# Patient Record
Sex: MALE | Race: WHITE | Employment: FULL TIME | URBAN - METROPOLITAN AREA
[De-identification: names, ages, dates, MRNs, and addresses within clinical notes are randomized per-mention and may not be internally consistent; named-entity substitution may affect disease eponyms.]

---

## 2017-01-06 ENCOUNTER — APPOINTMENT (OUTPATIENT)
Dept: CT IMAGING | Age: 52
End: 2017-01-06
Attending: EMERGENCY MEDICINE
Payer: COMMERCIAL

## 2017-01-06 ENCOUNTER — HOSPITAL ENCOUNTER (EMERGENCY)
Age: 52
Discharge: HOME | End: 2017-01-06
Attending: EMERGENCY MEDICINE
Payer: COMMERCIAL

## 2017-01-06 VITALS
DIASTOLIC BLOOD PRESSURE: 92 MMHG | BODY MASS INDEX: 24.44 KG/M2 | RESPIRATION RATE: 16 BRPM | HEIGHT: 71 IN | HEART RATE: 85 BPM | WEIGHT: 174.6 LBS | OXYGEN SATURATION: 98 % | SYSTOLIC BLOOD PRESSURE: 144 MMHG | TEMPERATURE: 98 F

## 2017-01-06 DIAGNOSIS — N20.0 KIDNEY STONE: Primary | ICD-10-CM

## 2017-01-06 LAB
ALBUMIN SERPL BCP-MCNC: 4 G/DL (ref 3.5–5)
ALBUMIN/GLOB SERPL: 1.3 {RATIO} (ref 1.1–2.2)
ALP SERPL-CCNC: 60 U/L (ref 45–117)
ALT SERPL-CCNC: 29 U/L (ref 12–78)
ANION GAP BLD CALC-SCNC: 7 MMOL/L (ref 5–15)
APPEARANCE UR: CLEAR
AST SERPL W P-5'-P-CCNC: 19 U/L (ref 15–37)
BACTERIA URNS QL MICRO: NEGATIVE /HPF
BASOPHILS # BLD AUTO: 0 K/UL (ref 0–0.1)
BASOPHILS # BLD: 0 % (ref 0–1)
BILIRUB SERPL-MCNC: 0.6 MG/DL (ref 0.2–1)
BILIRUB UR QL: NEGATIVE
BUN SERPL-MCNC: 23 MG/DL (ref 6–20)
BUN/CREAT SERPL: 16 (ref 12–20)
CALCIUM SERPL-MCNC: 8.7 MG/DL (ref 8.5–10.1)
CHLORIDE SERPL-SCNC: 105 MMOL/L (ref 97–108)
CO2 SERPL-SCNC: 29 MMOL/L (ref 21–32)
COLOR UR: ABNORMAL
CREAT SERPL-MCNC: 1.44 MG/DL (ref 0.7–1.3)
EOSINOPHIL # BLD: 0 K/UL (ref 0–0.4)
EOSINOPHIL NFR BLD: 0 % (ref 0–7)
EPITH CASTS URNS QL MICRO: ABNORMAL /LPF
ERYTHROCYTE [DISTWIDTH] IN BLOOD BY AUTOMATED COUNT: 12.7 % (ref 11.5–14.5)
GLOBULIN SER CALC-MCNC: 3.1 G/DL (ref 2–4)
GLUCOSE SERPL-MCNC: 98 MG/DL (ref 65–100)
GLUCOSE UR STRIP.AUTO-MCNC: NEGATIVE MG/DL
HCT VFR BLD AUTO: 39.8 % (ref 36.6–50.3)
HGB BLD-MCNC: 13.1 G/DL (ref 12.1–17)
HGB UR QL STRIP: ABNORMAL
HYALINE CASTS URNS QL MICRO: ABNORMAL /LPF (ref 0–5)
KETONES UR QL STRIP.AUTO: NEGATIVE MG/DL
LEUKOCYTE ESTERASE UR QL STRIP.AUTO: NEGATIVE
LYMPHOCYTES # BLD AUTO: 13 % (ref 12–49)
LYMPHOCYTES # BLD: 1.2 K/UL (ref 0.8–3.5)
MCH RBC QN AUTO: 30.1 PG (ref 26–34)
MCHC RBC AUTO-ENTMCNC: 32.9 G/DL (ref 30–36.5)
MCV RBC AUTO: 91.5 FL (ref 80–99)
MONOCYTES # BLD: 1 K/UL (ref 0–1)
MONOCYTES NFR BLD AUTO: 11 % (ref 5–13)
NEUTS SEG # BLD: 6.7 K/UL (ref 1.8–8)
NEUTS SEG NFR BLD AUTO: 76 % (ref 32–75)
NITRITE UR QL STRIP.AUTO: NEGATIVE
PH UR STRIP: 5.5 [PH] (ref 5–8)
PLATELET # BLD AUTO: 179 K/UL (ref 150–400)
POTASSIUM SERPL-SCNC: 4 MMOL/L (ref 3.5–5.1)
PROT SERPL-MCNC: 7.1 G/DL (ref 6.4–8.2)
PROT UR STRIP-MCNC: 30 MG/DL
RBC # BLD AUTO: 4.35 M/UL (ref 4.1–5.7)
RBC #/AREA URNS HPF: ABNORMAL /HPF (ref 0–5)
SODIUM SERPL-SCNC: 141 MMOL/L (ref 136–145)
SP GR UR REFRACTOMETRY: 1.02 (ref 1–1.03)
UA: UC IF INDICATED,UAUC: ABNORMAL
UROBILINOGEN UR QL STRIP.AUTO: 0.2 EU/DL (ref 0.2–1)
WBC # BLD AUTO: 8.9 K/UL (ref 4.1–11.1)
WBC URNS QL MICRO: ABNORMAL /HPF (ref 0–4)

## 2017-01-06 PROCEDURE — 80053 COMPREHEN METABOLIC PANEL: CPT | Performed by: EMERGENCY MEDICINE

## 2017-01-06 PROCEDURE — 85025 COMPLETE CBC W/AUTO DIFF WBC: CPT | Performed by: EMERGENCY MEDICINE

## 2017-01-06 PROCEDURE — 74176 CT ABD & PELVIS W/O CONTRAST: CPT

## 2017-01-06 PROCEDURE — 99284 EMERGENCY DEPT VISIT MOD MDM: CPT

## 2017-01-06 PROCEDURE — 36415 COLL VENOUS BLD VENIPUNCTURE: CPT | Performed by: EMERGENCY MEDICINE

## 2017-01-06 PROCEDURE — 81001 URINALYSIS AUTO W/SCOPE: CPT | Performed by: EMERGENCY MEDICINE

## 2017-01-06 RX ORDER — TAMSULOSIN HYDROCHLORIDE 0.4 MG/1
0.4 CAPSULE ORAL DAILY
Qty: 7 CAP | Refills: 0 | Status: SHIPPED | OUTPATIENT
Start: 2017-01-06 | End: 2017-01-13

## 2017-01-06 RX ORDER — HYDROCODONE BITARTRATE AND ACETAMINOPHEN 5; 325 MG/1; MG/1
1 TABLET ORAL
Qty: 20 TAB | Refills: 0 | Status: SHIPPED | OUTPATIENT
Start: 2017-01-06

## 2017-01-06 RX ORDER — KETOROLAC TROMETHAMINE 10 MG/1
10 TABLET, FILM COATED ORAL
Qty: 10 TAB | Refills: 0 | Status: SHIPPED | OUTPATIENT
Start: 2017-01-06

## 2017-01-07 NOTE — ED NOTES
Pt discharged with written instructions and prescriptions at this time. Pt verbalizes understanding and all questions were answered. Discharged by Nicolas Chaves, in stable condition, with wife.

## 2017-01-07 NOTE — ED NOTES
Assumed care of pt at this time. Pt states that he started having left flank pain around 0730 this am that starts in the groin area and travel to the left side of his abdomen to his left flank. Pt states that this has happened to him in the past but it usually does not last more than 2 hrs but this time it is has been going on for 14 hrs. Pt complains of 3 out of 10 tolerable pain left flank pain. Assessment done at this time. Call bell in reach.

## 2017-01-07 NOTE — DISCHARGE INSTRUCTIONS
Kidney Stone: Care Instructions  Your Care Instructions    Kidney stones are formed when salts, minerals, and other substances normally found in the urine clump together. They can be as small as grains of sand or, rarely, as large as golf balls. While the stone is traveling through the ureter, which is the tube that carries urine from the kidney to the bladder, you will probably feel pain. The pain may be mild or very severe. You may also have some blood in your urine. As soon as the stone reaches the bladder, any intense pain should go away. If a stone is too large to pass on its own, you may need a medical procedure to help you pass the stone. The doctor has checked you carefully, but problems can develop later. If you notice any problems or new symptoms, get medical treatment right away. Follow-up care is a key part of your treatment and safety. Be sure to make and go to all appointments, and call your doctor if you are having problems. It's also a good idea to know your test results and keep a list of the medicines you take. How can you care for yourself at home? · Drink plenty of fluids, enough so that your urine is light yellow or clear like water. If you have kidney, heart, or liver disease and have to limit fluids, talk with your doctor before you increase the amount of fluids you drink. · Take pain medicines exactly as directed. Call your doctor if you think you are having a problem with your medicine. ¨ If the doctor gave you a prescription medicine for pain, take it as prescribed. ¨ If you are not taking a prescription pain medicine, ask your doctor if you can take an over-the-counter medicine. Read and follow all instructions on the label. · Your doctor may ask you to strain your urine so that you can collect your kidney stone when it passes. You can use a kitchen strainer or a tea strainer to catch the stone. Store it in a plastic bag until you see your doctor again.   Preventing future kidney stones  Some changes in your diet may help prevent kidney stones. Depending on the cause of your stones, your doctor may recommend that you:  · Drink plenty of fluids, enough so that your urine is light yellow or clear like water. If you have kidney, heart, or liver disease and have to limit fluids, talk with your doctor before you increase the amount of fluids you drink. · Limit coffee, tea, and alcohol. Also avoid grapefruit juice. · Do not take more than the recommended daily dose of vitamins C and D.  · Avoid antacids such as Gaviscon, Maalox, Mylanta, or Tums. · Limit the amount of salt (sodium) in your diet. · Eat a balanced diet that is not too high in protein. · Limit foods that are high in a substance called oxalate, which can cause kidney stones. These foods include dark green vegetables, rhubarb, chocolate, wheat bran, nuts, cranberries, and beans. When should you call for help? Call your doctor now or seek immediate medical care if:  · You cannot keep down fluids. · Your pain gets worse. · You have a fever or chills. · You have new or worse pain in your back just below your rib cage (the flank area). · You have new or more blood in your urine. Watch closely for changes in your health, and be sure to contact your doctor if:  · You do not get better as expected. Where can you learn more? Go to http://daron-ilan.info/. Enter M298 in the search box to learn more about \"Kidney Stone: Care Instructions. \"  Current as of: November 20, 2015  Content Version: 11.1  © 1754-0630 [employer]. Care instructions adapted under license by [employer] (which disclaims liability or warranty for this information). If you have questions about a medical condition or this instruction, always ask your healthcare professional. [address] any warranty or liability for your use of this information.          Learning About Diet for Kidney Stone Prevention  What are kidney stones? Kidney stones are made of salts and minerals in the urine that form small \"sania. \" Stones can form in the kidneys and the ureters (the tubes that lead from the kidneys to the bladder). They can also form in the bladder. Stones may not cause a problem as long as they stay in the kidneys. But they can cause sudden, severe pain. Pain is most likely when the stones travel from the kidneys to the bladder. Kidney stones can cause bloody urine. Kidney stones often run in families. You are more likely to get them if you don't drink enough fluids, mainly water. Certain foods and drinks and some dietary supplements may also increase your risk for kidney stones if you consume too much of them. What can you do to prevent kidney stones? Changing what you eat may not prevent all types of kidney stones. But for people who have a history of certain kinds of kidney stones, some changes in diet may help. A dietitian can help you set up a meal plan that includes healthy, low-oxalate choices. Here are some general guidelines to get you started. Plan your meals and snacks around foods that are low in oxalate. These foods include:  · Corn, kale, parsnips, and squash,. · Beef, chicken, pork, turkey, and fish. · Milk, butter, cheese, and yogurt. You can eat certain foods that are medium-high in oxalate, but eat them only once in a while. These foods include:  · Bread. · Brown rice. · English muffins. · Figs. · Popcorn. · String beans. · Tomatoes. Limit very high-oxalate foods, including:  · Black tea. · Coffee. · Chocolate. · Dark green vegetables. · Nuts. Here are some other things you can do to help prevent kidney stones. · Drink plenty of fluids. If you have kidney, heart, or liver disease and have to limit fluids, talk with your doctor before you increase the amount of fluids you drink.   · Do not take more than the recommended daily dose of vitamins C and D.  · Limit the salt in your diet. · Eat a balanced diet that is not too high in protein. Follow-up care is a key part of your treatment and safety. Be sure to make and go to all appointments, and call your doctor if you are having problems. It's also a good idea to know your test results and keep a list of the medicines you take. Where can you learn more? Go to http://daron-ilan.info/. Enter C138 in the search box to learn more about \"Learning About Diet for Kidney Stone Prevention. \"  Current as of: July 26, 2016  Content Version: 11.1  © 8291-4169 [employer]. Care instructions adapted under license by [employer] (which disclaims liability or warranty for this information). If you have questions about a medical condition or this instruction, always ask your healthcare professional. [address] any warranty or liability for your use of this information.

## 2017-01-07 NOTE — ED PROVIDER NOTES
HPI Comments: Dayday Mortensen is a 46 y.o. male presenting ambulatory to the ED with c/o intermittent episodes of left flank pain x few months. Pt reports the pain starts in his groin radiates to the left side of his abdomen and left flank. He states episodes usually last 2 hours, however today's episode started around 0730 and has worsened since then. Pt notes he had a physical a few months ago and had a trace amount of blood in his urine. He was told by his PCP Pt denies nausea, vomiting, urine frequency, hematuria, blood in stool, hx of kidney stones, or dysuria. PCP: Frandy Buitrago MD      There are no other complaints, changes or physical findings at this time. Written by RAÚL Arrington, as dictated by Anu Joseph DO      The history is provided by the patient. No past medical history on file. No past surgical history on file. No family history on file. Social History     Social History    Marital status:      Spouse name: N/A    Number of children: N/A    Years of education: N/A     Occupational History    Not on file. Social History Main Topics    Smoking status: Not on file    Smokeless tobacco: Not on file    Alcohol use Not on file    Drug use: Not on file    Sexual activity: Not on file     Other Topics Concern    Not on file     Social History Narrative         ALLERGIES: Review of patient's allergies indicates no known allergies. Review of Systems   Constitutional: Negative for chills, fatigue and fever. HENT: Negative for congestion and rhinorrhea. Eyes: Negative for visual disturbance. Respiratory: Negative for cough, shortness of breath and wheezing. Cardiovascular: Negative for chest pain and palpitations. Gastrointestinal: Positive for abdominal pain (left side). Negative for abdominal distention, constipation, diarrhea, nausea and vomiting. Endocrine: Negative. Genitourinary: Positive for flank pain (left). Negative for difficulty urinating and dysuria. Skin: Negative for rash. Neurological: Negative for dizziness, weakness and light-headedness. Psychiatric/Behavioral: Negative for suicidal ideas. All other systems reviewed and are negative. Patient Vitals for the past 12 hrs:   Temp Pulse Resp BP SpO2   01/06/17 2230 - - - (!) 144/92 98 %   01/06/17 2200 - - - 146/88 98 %   01/06/17 2155 - - - - 95 %   01/06/17 2154 - - - (!) 148/95 -   01/06/17 2130 98 °F (36.7 °C) 85 16 (!) 144/103 100 %              Physical Exam   Constitutional: He is oriented to person, place, and time. He appears well-developed and well-nourished. No distress. HENT:   Head: Normocephalic and atraumatic. Mouth/Throat: Oropharynx is clear and moist.   Eyes: Conjunctivae and EOM are normal.   Neck: Neck supple. No JVD present. No tracheal deviation present. Cardiovascular: Normal rate, regular rhythm and intact distal pulses. Exam reveals no gallop and no friction rub. No murmur heard. Pulmonary/Chest: Effort normal and breath sounds normal. No stridor. No respiratory distress. He has no wheezes. Abdominal: Soft. Bowel sounds are normal. He exhibits no distension and no mass. There is no tenderness. There is no guarding and no CVA tenderness. Musculoskeletal: Normal range of motion. He exhibits no edema or tenderness. No deformity   Neurological: He is alert and oriented to person, place, and time. He has normal strength. No focal deficits   Skin: Skin is warm, dry and intact. No rash noted. Psychiatric: He has a normal mood and affect. His behavior is normal. Judgment and thought content normal.   Nursing note and vitals reviewed.        MDM  Number of Diagnoses or Management Options  Kidney stone:   Diagnosis management comments: DDx: renal stone, uti, pyelonephritis, diverticulitis, constipation       Amount and/or Complexity of Data Reviewed  Clinical lab tests: ordered and reviewed  Tests in the radiology section of CPT®: ordered and reviewed    Patient Progress  Patient progress: stable    ED Course       Procedures    Progress Note:  Reviewed lab and imaging results with patient. Discussed the need for patient to follow up with urology, provided patient with the number for the Kidney Stone hotline at [facility]. Patient in NAD, denies any pain. Patient and family expressed agreement and understanding with plan. Sonia Farrah DO      LABORATORY TESTS:  Recent Results (from the past 12 hour(s))   URINALYSIS W/ REFLEX CULTURE    Collection Time: 01/06/17  9:56 PM   Result Value Ref Range    Color YELLOW/STRAW      Appearance CLEAR CLEAR      Specific gravity 1.023 1.003 - 1.030      pH (UA) 5.5 5.0 - 8.0      Protein 30 (A) NEG mg/dL    Glucose NEGATIVE  NEG mg/dL    Ketone NEGATIVE  NEG mg/dL    Bilirubin NEGATIVE  NEG      Blood LARGE (A) NEG      Urobilinogen 0.2 0.2 - 1.0 EU/dL    Nitrites NEGATIVE  NEG      Leukocyte Esterase NEGATIVE  NEG      WBC 0-4 0 - 4 /hpf    RBC 20-50 0 - 5 /hpf    Epithelial cells FEW FEW /lpf    Bacteria NEGATIVE  NEG /hpf    UA:UC IF INDICATED CULTURE NOT INDICATED BY UA RESULT CNI      Hyaline Cast 0-2 0 - 5 /lpf   METABOLIC PANEL, COMPREHENSIVE    Collection Time: 01/06/17 10:34 PM   Result Value Ref Range    Sodium 141 136 - 145 mmol/L    Potassium 4.0 3.5 - 5.1 mmol/L    Chloride 105 97 - 108 mmol/L    CO2 29 21 - 32 mmol/L    Anion gap 7 5 - 15 mmol/L    Glucose 98 65 - 100 mg/dL    BUN 23 (H) 6 - 20 MG/DL    Creatinine 1.44 (H) 0.70 - 1.30 MG/DL    BUN/Creatinine ratio 16 12 - 20      GFR est AA >60 >60 ml/min/1.73m2    GFR est non-AA 52 (L) >60 ml/min/1.73m2    Calcium 8.7 8.5 - 10.1 MG/DL    Bilirubin, total 0.6 0.2 - 1.0 MG/DL    ALT 29 12 - 78 U/L    AST 19 15 - 37 U/L    Alk.  phosphatase 60 45 - 117 U/L    Protein, total 7.1 6.4 - 8.2 g/dL    Albumin 4.0 3.5 - 5.0 g/dL    Globulin 3.1 2.0 - 4.0 g/dL    A-G Ratio 1.3 1.1 - 2.2     CBC WITH AUTOMATED DIFF    Collection Time: 01/06/17 10:34 PM   Result Value Ref Range    WBC 8.9 4.1 - 11.1 K/uL    RBC 4.35 4.10 - 5.70 M/uL    HGB 13.1 12.1 - 17.0 g/dL    HCT 39.8 36.6 - 50.3 %    MCV 91.5 80.0 - 99.0 FL    MCH 30.1 26.0 - 34.0 PG    MCHC 32.9 30.0 - 36.5 g/dL    RDW 12.7 11.5 - 14.5 %    PLATELET 734 214 - 948 K/uL    NEUTROPHILS 76 (H) 32 - 75 %    LYMPHOCYTES 13 12 - 49 %    MONOCYTES 11 5 - 13 %    EOSINOPHILS 0 0 - 7 %    BASOPHILS 0 0 - 1 %    ABS. NEUTROPHILS 6.7 1.8 - 8.0 K/UL    ABS. LYMPHOCYTES 1.2 0.8 - 3.5 K/UL    ABS. MONOCYTES 1.0 0.0 - 1.0 K/UL    ABS. EOSINOPHILS 0.0 0.0 - 0.4 K/UL    ABS. BASOPHILS 0.0 0.0 - 0.1 K/UL       IMAGING RESULTS:  EXAM: CT ABD PELV WO CONT     INDICATION: Left lower quadrant and left flank pain since this morning     COMPARISON: None.     CONTRAST: None.      TECHNIQUE:   Unenhanced multislice helical CT was performed from the diaphragm to the  symphysis pubis without oral or intravenous contrast administration. Contiguous  5 mm axial images were reconstructed and lung and soft tissue windows were  generated. Coronal and sagittal reformations were generated. CT dose reduction  was achieved through use of a standardized protocol tailored for this  examination and automatic exposure control for dose modulation.     FINDINGS:  The visualized portions of the lung bases are clear.     The absence of intravenous contrast material reduces the sensitivity for  evaluation of the solid parenchymal organs of the abdomen. There is a 9 mm  low-density lesion in the right kidney, too small to characterize given the lack  of intravenous contrast. No focal abnormalities are seen within the liver,  spleen, pancreas or adrenal glands. There is a 7 mm stone in the left mid  ureter causing moderate left hydronephrosis and mild perinephric inflammation. No gallstones are noted.      The aorta tapers without aneurysm. There is no retroperitoneal adenopathy or  mass.  There is no ascites or free intraperitoneal air.     The bowel is not obstructed. The appendix is normal in appearance. There is no  pelvic mass or adenopathy.     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     IMPRESSION  IMPRESSION: 7 mm stone left mid ureter causing moderate left hydronephrosis and  mild perinephric inflammation.       MEDICATIONS GIVEN:  Medications - No data to display    IMPRESSION:  1. Kidney stone        PLAN:  1. Discharge Medication List as of 1/6/2017 11:15 PM      START taking these medications    Details   tamsulosin (FLOMAX) 0.4 mg capsule Take 1 Cap by mouth daily for 7 doses. , Print, Disp-7 Cap, R-0      HYDROcodone-acetaminophen (NORCO) 5-325 mg per tablet Take 1 Tab by mouth every four (4) hours as needed for Pain. Max Daily Amount: 6 Tabs., Print, Disp-20 Tab, R-0      ketorolac (TORADOL) 10 mg tablet Take 1 Tab by mouth every six (6) hours as needed for Pain., Print, Disp-10 Tab, R-0           2. Follow-up Information     Follow up With Details Comments [address] [facility] Schedule an appointment as soon as possible for a visit As needed, If symptoms worsen [address] [facility] [place] [phone]      [facility]ospit[facility] EMERGENCY DEPT Schedule an appointment as soon as possible for a visit As needed, If symptoms worsen [address]  [address] [facility]  [phone]        Return to ED if worse     DISCHARGE NOTE  11:27 PM  The patient has been re-evaluated and is ready for discharge. Reviewed available results with patient. Counseled pt on diagnosis and care plan. Pt has expressed understanding, and all questions have been answered. Pt agrees with plan and agrees to F/U as recommended, or return to the ED if their sxs worsen. Discharge instructions have been provided and explained to the pt, along with reasons to return to the ED. Written by Isaias Caceres, ED Scribe, as dictated by Lucina Butler DO.       This note is prepared by Isaias Caceres, acting as Scribe for [email or web address] Janet Whitt DO. Suma Rolon DO : The scribe's documentation has been prepared under my direction and personally reviewed by me in its entirety. I confirm that the note above accurately reflects all work, treatment, procedures, and medical decision making performed by me.

## 2021-12-07 ENCOUNTER — OFFICE VISIT (OUTPATIENT)
Dept: ORTHOPEDIC SURGERY | Age: 56
End: 2021-12-07
Payer: COMMERCIAL

## 2021-12-07 DIAGNOSIS — M25.561 ACUTE PAIN OF BOTH KNEES: Primary | ICD-10-CM

## 2021-12-07 DIAGNOSIS — M25.562 ACUTE PAIN OF BOTH KNEES: Primary | ICD-10-CM

## 2021-12-07 DIAGNOSIS — M25.461 KNEE EFFUSION, RIGHT: ICD-10-CM

## 2021-12-07 DIAGNOSIS — M25.462 KNEE EFFUSION, LEFT: ICD-10-CM

## 2021-12-07 PROCEDURE — 99203 OFFICE O/P NEW LOW 30 MIN: CPT | Performed by: ORTHOPAEDIC SURGERY

## 2021-12-07 NOTE — PROGRESS NOTES
Carole Colunga (: 1965) is a 64 y.o. male, patient, here for evaluation of the following chief complaint(s):  Knee Pain (bilateral knee pain)       HPI:    Patient presents the office today with a chief complaint of bilateral knee pain. Is a very active 63-year-old. He has been noticing recurrent pain mostly along the medial aspect of the right knee. He also has noted some swelling. He states the swelling is fairly well controlled today. At times he states his knee feels very swollen. Over the past 6 weeks, he has now developed a very similar finding in his left knee. He is very active he likes to play volleyball. He has had to stop playing volleyball secondary to the pain. He has recurrent pain swelling and medial based knee pain. He describes catching and popping sensation in both knees. He has tried anti-inflammatory medication. He is obviously modified his activity. His symptoms are getting worse instead of better. No Known Allergies    Current Outpatient Medications   Medication Sig    HYDROcodone-acetaminophen (NORCO) 5-325 mg per tablet Take 1 Tab by mouth every four (4) hours as needed for Pain. Max Daily Amount: 6 Tabs.  ketorolac (TORADOL) 10 mg tablet Take 1 Tab by mouth every six (6) hours as needed for Pain. No current facility-administered medications for this visit. No past medical history on file. No past surgical history on file. No family history on file.      Social History     Socioeconomic History    Marital status:      Spouse name: Not on file    Number of children: Not on file    Years of education: Not on file    Highest education level: Not on file   Occupational History    Not on file   Tobacco Use    Smoking status: Not on file    Smokeless tobacco: Not on file   Substance and Sexual Activity    Alcohol use: Not on file    Drug use: Not on file    Sexual activity: Not on file   Other Topics Concern    Not on file   Social History Narrative    Not on file     Social Determinants of Health     Financial Resource Strain:     Difficulty of Paying Living Expenses: Not on file   Food Insecurity:     Worried About Running Out of Food in the Last Year: Not on file    Edgar of Food in the Last Year: Not on file   Transportation Needs:     Lack of Transportation (Medical): Not on file    Lack of Transportation (Non-Medical): Not on file   Physical Activity:     Days of Exercise per Week: Not on file    Minutes of Exercise per Session: Not on file   Stress:     Feeling of Stress : Not on file   Social Connections:     Frequency of Communication with Friends and Family: Not on file    Frequency of Social Gatherings with Friends and Family: Not on file    Attends Yarsani Services: Not on file    Active Member of [address] [place] [address] [employer] or Organizations: Not on file    Attends Club or Organization Meetings: Not on file    Marital Status: Not on file   Intimate Partner Violence:     Fear of Current or Ex-Partner: Not on file    Emotionally Abused: Not on file    Physically Abused: Not on file    Sexually Abused: Not on file   Housing Stability:     Unable to Pay for Housing in the Last Year: Not on file    Number of [place] in the Last Year: Not on file    Unstable Housing in the Last Year: Not on file       Review of Systems   Musculoskeletal:        Bilateral knee sudhakar       Vitals: There were no vitals taken for this visit. There is no height or weight on file to calculate BMI. Ortho Exam     Right knee: No effusion. There is no pain with manipulation of the patella. No crepitation with passive or active range of motion of the patella. Positive medial joint line tenderness. Positive medial Nhung's maneuver. There is no lateral joint line tenderness. Nhung's maneuvers negative for lateral joint line tenderness. Collateral ligaments are intact. Lachman's test negative. Anterior drawer and posterior drawer negative. There is no soft tissue swelling distally. Neurovascular examination is intact. Left knee:  No effusion. There is no pain with manipulation of the patella. No crepitation with passive or active range of motion of the patella. Positive medial joint line tenderness. Positive medial Nhung's maneuver. There is no lateral joint line tenderness. Nhung's maneuvers negative for lateral joint line tenderness. Collateral ligaments are intact. Lachman's test negative. Anterior drawer and posterior drawer negative. There is no soft tissue swelling distally. Neurovascular examination is intact. XR Results (most recent):  Results from Appointment encounter on 12/07/21    XR KNEE RT MAX 2 VWS    Narrative  2 view x-ray of his right knee is similar to the left with no significant changes of osteoarthritis. He does have some mild patellofemoral arthritis. XR Results (most recent):  Results from Appointment encounter on 12/07/21    XR KNEE RT MAX 2 VWS    Narrative  2 view x-ray of his right knee is similar to the left with no significant changes of osteoarthritis. He does have some mild patellofemoral arthritis. XR KNEE LT MAX 2 VWS    Narrative  2 view x-ray of his left knee reveals no fracture dislocation no significant signs of osteoarthritis of the tibiofemoral joint. He does have some mild patellofemoral joint changes. ASSESSMENT/PLAN:    Patient presents the office today with signs and symptoms consistent with medial meniscus pathology. At this stage, I would suggest proceeding with an MRI as he has a high probability this may require surgical attention. Patient is to return to the office after the MRI.         Kimberly Garcia MD

## 2021-12-10 ENCOUNTER — TELEPHONE (OUTPATIENT)
Dept: ORTHOPEDIC SURGERY | Age: 56
End: 2021-12-10

## 2021-12-20 DIAGNOSIS — M25.462 KNEE EFFUSION, LEFT: ICD-10-CM

## 2021-12-20 DIAGNOSIS — M25.461 KNEE EFFUSION, RIGHT: Primary | ICD-10-CM

## 2022-01-10 ENCOUNTER — OFFICE VISIT (OUTPATIENT)
Dept: ORTHOPEDIC SURGERY | Age: 57
End: 2022-01-10
Payer: COMMERCIAL

## 2022-01-10 VITALS — BODY MASS INDEX: 23.8 KG/M2 | HEIGHT: 71 IN | WEIGHT: 170 LBS

## 2022-01-10 DIAGNOSIS — M22.42 CHONDROMALACIA OF BOTH PATELLAE: Primary | ICD-10-CM

## 2022-01-10 DIAGNOSIS — M22.41 CHONDROMALACIA OF BOTH PATELLAE: Primary | ICD-10-CM

## 2022-01-10 PROCEDURE — 99213 OFFICE O/P EST LOW 20 MIN: CPT | Performed by: ORTHOPAEDIC SURGERY

## 2022-01-10 NOTE — PROGRESS NOTES
Micahel Hart (: 1965) is a 64 y.o. male, patient, here for evaluation of the following chief complaint(s):  Knee Pain (bilateral knee MRI)       HPI:    Patient returns to the office now s/p MRI evaluation of both knees. He continues to have discomfort in both knees although he states is gotten a little bit better with reduction of his activity. No Known Allergies    Current Outpatient Medications   Medication Sig    HYDROcodone-acetaminophen (NORCO) 5-325 mg per tablet Take 1 Tab by mouth every four (4) hours as needed for Pain. Max Daily Amount: 6 Tabs.  ketorolac (TORADOL) 10 mg tablet Take 1 Tab by mouth every six (6) hours as needed for Pain. No current facility-administered medications for this visit. No past medical history on file. No past surgical history on file. No family history on file. Social History     Socioeconomic History    Marital status:      Spouse name: Not on file    Number of children: Not on file    Years of education: Not on file    Highest education level: Not on file   Occupational History    Not on file   Tobacco Use    Smoking status: Not on file    Smokeless tobacco: Not on file   Substance and Sexual Activity    Alcohol use: Not on file    Drug use: Not on file    Sexual activity: Not on file   Other Topics Concern    Not on file   Social History Narrative    Not on file     Social Determinants of Health     Financial Resource Strain:     Difficulty of Paying Living Expenses: Not on file   Food Insecurity:     Worried About Running Out of Food in the Last Year: Not on file    Edgar of Food in the Last Year: Not on file   Transportation Needs:     Lack of Transportation (Medical): Not on file    Lack of Transportation (Non-Medical):  Not on file   Physical Activity:     Days of Exercise per Week: Not on file    Minutes of Exercise per Session: Not on file   Stress:     Feeling of Stress : Not on file   Social Connections:     Frequency of Communication with Friends and Family: Not on file    Frequency of Social Gatherings with Friends and Family: Not on file    Attends Sikhism Services: Not on file    Active Member of Clubs or Organizations: Not on file    Attends Club or Organization Meetings: Not on file    Marital Status: Not on file   Intimate Partner Violence:     Fear of Current or Ex-Partner: Not on file    Emotionally Abused: Not on file    Physically Abused: Not on file    Sexually Abused: Not on file   Housing Stability:     Unable to Pay for Housing in the Last Year: Not on file    Number of [place] in the Last Year: Not on file    Unstable Housing in the Last Year: Not on file       Review of Systems   Musculoskeletal:        Bilateral knee MRI results       Vitals:  Ht 5' 11\" (1.803 m)   Wt 170 lb (77.1 kg)   BMI 23.71 kg/m²    Body mass index is 23.71 kg/m². Ortho Exam     Right knee: Effusion has resolved. He has full range of motion of the knee. Mild crepitation. He has mild medial as well as lateral joint line tenderness. He has no instability. Neurovascular examinations intact. Left knee: Effusion has resolved. He has full range of motion of the knee. Mild crepitation. He has mild medial as well as lateral joint line tenderness. He has no instability. Neurovascular examinations intact. MRI evaluation of the right knee shows evidence of chondromalacia of the patella. MRI evaluation of the left knee shows pretty similar level of chondromalacia of the patella. There are some changes located in the lateral meniscus posterior horn very minor. ASSESSMENT/PLAN:    I have gone over the MRI results. I do not appreciate any major meniscal findings that would justify his level of pain. 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 Because of this, I think it is reasonable to treat this conservatively. We have discussed activity modifications and proper use of bracing.   We will advance his activities. Worry we will have him return to the office in 4 weeks.   If his symptoms worsen, we may need to consider arthroscopic evaluation of his knees        Cruz Rutherford MD

## 2022-09-29 ENCOUNTER — OFFICE VISIT (OUTPATIENT)
Dept: ORTHOPEDIC SURGERY | Age: 57
End: 2022-09-29
Payer: COMMERCIAL

## 2022-09-29 DIAGNOSIS — S83.282A ACUTE LATERAL MENISCUS TEAR OF LEFT KNEE, INITIAL ENCOUNTER: Primary | ICD-10-CM

## 2022-09-29 DIAGNOSIS — S83.232A COMPLEX TEAR OF MEDIAL MENISCUS OF LEFT KNEE AS CURRENT INJURY, INITIAL ENCOUNTER: ICD-10-CM

## 2022-09-29 PROCEDURE — 99214 OFFICE O/P EST MOD 30 MIN: CPT | Performed by: ORTHOPAEDIC SURGERY

## 2022-09-29 NOTE — PROGRESS NOTES
Ghislaine Mack (: 1965) is a 62 y.o. male, patient, here for evaluation of the following chief complaint(s):  Knee Pain (Patient here for left knee pain.)       HPI:    Presents for repeat evaluation of his left knee. He states that after his prior visit 6 months ago with us his bilateral knee pain completely resolved. 2 months ago after playing volleyball he had significant pain on the medial side of his left knee. He has pain with walking and full extension. Notes occasional clicking and locking. Has noticed swelling off and on within the knee. No Known Allergies    Current Outpatient Medications   Medication Sig    HYDROcodone-acetaminophen (NORCO) 5-325 mg per tablet Take 1 Tab by mouth every four (4) hours as needed for Pain. Max Daily Amount: 6 Tabs.    ketorolac (TORADOL) 10 mg tablet Take 1 Tab by mouth every six (6) hours as needed for Pain. No current facility-administered medications for this visit. No past medical history on file. No past surgical history on file. No family history on file.      Social History     Socioeconomic History    Marital status:      Spouse name: Not on file    Number of children: Not on file    Years of education: Not on file    Highest education level: Not on file   Occupational History    Not on file   Tobacco Use    Smoking status: Not on file    Smokeless tobacco: Not on file   Substance and Sexual Activity    Alcohol use: Not on file    Drug use: Not on file    Sexual activity: Not on file   Other Topics Concern    Not on file   Social History Narrative    Not on file     Social Determinants of Health     Financial Resource Strain: Not on file   Food Insecurity: Not on file   Transportation Needs: Not on file   Physical Activity: Not on file   Stress: Not on file   Social Connections: Not on file   Intimate Partner Violence: Not on file   Housing Stability: Not on file       Review of Systems   Musculoskeletal:         Left knee pain     Vitals: There were no vitals taken for this visit. There is no height or weight on file to calculate BMI. Ortho Exam     Patient is alert and oriented x3. Patient is in no acute distress. Patient ambulates with a nonantalgic gait. Left knee: Mild effusion of the knee is noted. Patient has full range of motion from 0-130 but has pain with hyperflexion hyperextension maneuvers. There is medial joint line tenderness, as well as pain along the proximal aspect of the medial tibia. .  Pain is worsened with Nhung's maneuver. Knee is stable to varus and valgus stress at 0 and 30 degrees. There is a negative Lachman's, negative anterior and posterior drawer tests. Distally the extremity is neurovascularly intact. Right knee: There is no abrasions, lacerations, ecchymosis or soft tissue swelling. No effusion is identified. There is no pain to palpation along the medial or lateral border of the patella. There is no pain or crepitation with manipulation of the patella. There is normal excursion of the patella. Patellar grind test is negative. Active and passive range of motion is full and does not cause pain or crepitation. There is no pain with palpation along the medial femoral epicondyle or medial tibia and no pain with palpation over the lateral femoral epicondyle. There is no medial or lateral joint line tenderness. Nhung's maneuver is negative. There is no collateral ligament instability. Anterior drawer, Lachman and posterior drawer are negative. There is no soft tissue swelling distally into the leg. Neurocirculatory examination is intact. Prior x-rays do not reveal any evidence of arthritis no fractures no dislocation noted. ASSESSMENT/PLAN:    Patient's symptoms of with his left knee are consistent with an injury to his medial meniscus.   He has positive effusion, pain with hyperflexion hyperextension positioning, reproducible pain with Nhung's maneuver as well as medial joint line tenderness. Knee is appropriate to repeat an MRI of his left knee to evaluate for new medial meniscus injury.   We will see him back following this exam.        Chanda French MD

## 2022-10-11 ENCOUNTER — OFFICE VISIT (OUTPATIENT)
Dept: ORTHOPEDIC SURGERY | Age: 57
End: 2022-10-11
Payer: COMMERCIAL

## 2022-10-11 DIAGNOSIS — S83.232D COMPLEX TEAR OF MEDIAL MENISCUS OF LEFT KNEE AS CURRENT INJURY, SUBSEQUENT ENCOUNTER: Primary | ICD-10-CM

## 2022-10-11 PROCEDURE — 99214 OFFICE O/P EST MOD 30 MIN: CPT | Performed by: ORTHOPAEDIC SURGERY

## 2022-10-11 NOTE — PROGRESS NOTES
Norma Winston (: 1965) is a 62 y.o. male, patient, here for evaluation of the following chief complaint(s):  Knee Pain (Left knee )       HPI:    Patient is here status post left knee MRI. Overall his pain is under range. Able to play volleyball however has significant pain following on the medial side of his knee. No Known Allergies    Current Outpatient Medications   Medication Sig    HYDROcodone-acetaminophen (NORCO) 5-325 mg per tablet Take 1 Tab by mouth every four (4) hours as needed for Pain. Max Daily Amount: 6 Tabs.    ketorolac (TORADOL) 10 mg tablet Take 1 Tab by mouth every six (6) hours as needed for Pain. No current facility-administered medications for this visit. No past medical history on file. No past surgical history on file. No family history on file. Social History     Socioeconomic History    Marital status:      Spouse name: Not on file    Number of children: Not on file    Years of education: Not on file    Highest education level: Not on file   Occupational History    Not on file   Tobacco Use    Smoking status: Not on file    Smokeless tobacco: Not on file   Substance and Sexual Activity    Alcohol use: Not on file    Drug use: Not on file    Sexual activity: Not on file   Other Topics Concern    Not on file   Social History Narrative    Not on file     Social Determinants of Health     Financial Resource Strain: Not on file   Food Insecurity: Not on file   Transportation Needs: Not on file   Physical Activity: Not on file   Stress: Not on file   Social Connections: Not on file   Intimate Partner Violence: Not on file   Housing Stability: Not on file       Review of Systems   Musculoskeletal:         Left knee MRI results     Vitals: There were no vitals taken for this visit. There is no height or weight on file to calculate BMI. Ortho Exam     Left knee: Mild effusion of the knee is noted.   Patient has full range of motion from 0-130 but has pain with hyperflexion hyperextension maneuvers. There is medial joint line tenderness. Pain is worsened with Nhung's maneuver. Knee is stable to varus and valgus stress at 0 and 30 degrees. There is a negative Lachman's, negative anterior and posterior drawer tests. Distally the extremity is neurovascularly intact. MRI of the left knee was reviewed and compared to his MRI from 10 months prior. He has a medial meniscus tear on his new MRI was not present from his prior MRI. ASSESSMENT/PLAN:    Patient has a left knee medial meniscus tear. We discussed both surgical and nonsurgical options. Given his symptoms is still present given his long attempt at nonoperative care think is reasonable for left knee arthroscopy with medial meniscectomy. The risks and benefits were described to the patient. However, we did discuss nonoperative treatment. He is going to review his calendar and take a good window of opportunity to proceed with surgery. I have gone over the risks and benefits. The patient understands there is a risk of infection, postoperative pain, numbness, tingling, stiffness DVT, PE, MI, CVA and any other unforeseen events. The patient also understands there is a long rehabilitative process that typically follows the surgical procedure. We talked about the possibility of not being able to alleviate all of the discomfort. Also, I explained  there is no guarantee all function and strength will return. The patient understands the possiblity that  implants may be utilized during this surgery. The patient also understands the generalized, associated risk of anesthetic and wishes to proceed in an elective fashion.         Mariah Mon MD

## 2022-10-13 DIAGNOSIS — S83.232D COMPLEX TEAR OF MEDIAL MENISCUS OF LEFT KNEE AS CURRENT INJURY, SUBSEQUENT ENCOUNTER: Primary | ICD-10-CM

## 2022-10-13 DIAGNOSIS — Z98.890 HISTORY OF ARTHROSCOPY OF LEFT KNEE: Primary | ICD-10-CM

## 2022-10-20 RX ORDER — OXYCODONE AND ACETAMINOPHEN 5; 325 MG/1; MG/1
1 TABLET ORAL
Qty: 30 TABLET | Refills: 0 | Status: SHIPPED | OUTPATIENT
Start: 2022-10-20 | End: 2022-10-25

## 2022-11-01 ENCOUNTER — OFFICE VISIT (OUTPATIENT)
Dept: ORTHOPEDIC SURGERY | Age: 57
End: 2022-11-01
Payer: COMMERCIAL

## 2022-11-01 DIAGNOSIS — Z98.890 S/P LEFT KNEE ARTHROSCOPY: Primary | ICD-10-CM

## 2022-11-01 PROCEDURE — 99024 POSTOP FOLLOW-UP VISIT: CPT | Performed by: ORTHOPAEDIC SURGERY

## 2022-11-01 NOTE — PROGRESS NOTES
Viviana Roberts (: 1965) is a 62 y.o. male, patient, here for evaluation of the following chief complaint(s):  Knee Pain (Left knee post op)       HPI:    Patient presents for first postoperative visit for left knee partial medial meniscectomy and chondroplasty of the patellofemoral and medial compartments performed on 10/22/2022. Patient states he is doing very well and has very minimal pain. He is walking without assistance. He states he did have fairly big effusion after surgery but it has been going down since then. He has already started physical therapy on his own as the patient is very active and motivated. Patient denies shortness of breath or calf pain. No Known Allergies    Current Outpatient Medications   Medication Sig    HYDROcodone-acetaminophen (NORCO) 5-325 mg per tablet Take 1 Tab by mouth every four (4) hours as needed for Pain. Max Daily Amount: 6 Tabs.    ketorolac (TORADOL) 10 mg tablet Take 1 Tab by mouth every six (6) hours as needed for Pain. No current facility-administered medications for this visit. No past medical history on file. No past surgical history on file. No family history on file.      Social History     Socioeconomic History    Marital status:      Spouse name: Not on file    Number of children: Not on file    Years of education: Not on file    Highest education level: Not on file   Occupational History    Not on file   Tobacco Use    Smoking status: Not on file    Smokeless tobacco: Not on file   Substance and Sexual Activity    Alcohol use: Not on file    Drug use: Not on file    Sexual activity: Not on file   Other Topics Concern    Not on file   Social History Narrative    Not on file     Social Determinants of Health     Financial Resource Strain: Not on file   Food Insecurity: Not on file   Transportation Needs: Not on file   Physical Activity: Not on file   Stress: Not on file   Social Connections: Not on file   Intimate Partner Violence: Not on file   Housing Stability: Not on file       Review of Systems   Musculoskeletal:         Left knee post op     Vitals: There were no vitals taken for this visit. There is no height or weight on file to calculate BMI. Ortho Exam     Left knee: There is a moderate effusion to the left knee. Incisions are well-healing and sutures removed today. No erythema no drainage. Patient has range of motion from 0 to 120 degrees of knee flexion. He is neurovascular intact distally. ASSESSMENT/PLAN:    Status post left knee arthroscopic partial medial meniscectomy and chondroplasty of the patellofemoral and medial compartments on 10/22/2022.    -Continue with home physical therapy regimen  -Sutures were removed today in office  -Follow-up in 1 month for reevaluation I have gone over home exercise program with the patient. He does not want to perform structured physical therapy.         Jose Hernandez MD

## 2023-05-17 RX ORDER — HYDROCODONE BITARTRATE AND ACETAMINOPHEN 5; 325 MG/1; MG/1
1 TABLET ORAL EVERY 4 HOURS PRN
COMMUNITY
Start: 2017-01-06

## 2023-05-17 RX ORDER — KETOROLAC TROMETHAMINE 10 MG/1
10 TABLET, FILM COATED ORAL EVERY 6 HOURS PRN
COMMUNITY
Start: 2017-01-06